# Patient Record
Sex: FEMALE | Race: WHITE | NOT HISPANIC OR LATINO | Employment: OTHER | URBAN - METROPOLITAN AREA
[De-identification: names, ages, dates, MRNs, and addresses within clinical notes are randomized per-mention and may not be internally consistent; named-entity substitution may affect disease eponyms.]

---

## 2017-06-19 ENCOUNTER — TRANSCRIBE ORDERS (OUTPATIENT)
Dept: ADMINISTRATIVE | Facility: HOSPITAL | Age: 72
End: 2017-06-19

## 2017-06-19 DIAGNOSIS — E04.2 NONTOXIC MULTINODULAR GOITER: Primary | ICD-10-CM

## 2017-06-19 DIAGNOSIS — E21.3 HYPERPARATHYROIDISM, UNSPECIFIED (HCC): ICD-10-CM

## 2017-08-02 ENCOUNTER — CONVERSION ENCOUNTER (OUTPATIENT)
Dept: RADIOLOGY | Facility: IMAGING CENTER | Age: 72
End: 2017-08-02

## 2017-09-26 ENCOUNTER — ALLSCRIPTS OFFICE VISIT (OUTPATIENT)
Dept: OTHER | Facility: OTHER | Age: 72
End: 2017-09-26

## 2017-10-27 NOTE — PROGRESS NOTES
Assessment  1  Chronic kidney disease (CKD), stage III (moderate) (585 3) (N18 3)   2  Hypercalcemia (275 42) (E83 52)   3  Benign hypertension with chronic kidney disease, stage III (403 10,585 3) (I12 9,N18 3)   4  Vitamin D insufficiency (268 9) (E55 9)    Discussion/Summary    Chronic kidney disease stage III B  with her baseline serum creatinine around 1 6Her serum creatinine 1 3-1 4 in 2014 which slowly progressively worsened to 1 6, last creatinine in the 1 5 in April 2016, no recent BMP results available since then  Repeat, CBC, BMP, phosphorus, PTH, UPC ratio before next visit  Her chronic kidney disease can be multifactorial given her long-term history of hypertension causing likely hypertensive nephrosclerosis, chronic lithium use can cause chronic interstitial nephritis, and significant use of NSAIDs in the past Patient not able to come off lithium as she has been doing very well on tapering dose of lithium Continue to monitor serum creatinine closely  No NSAIDs or nephrotoxins; No Fleet enemaRenal ultrasound in 2016 showed right kidney #8 6 cm, left kidney 9 6 cm, diffuse increased echogenicity in both kidneys, no hydronephrosis, no stones, bladder scan PVR nonsignificant  likely primary hyperparathyroidism (given the high PTH, high calcium, low normal vitamin D level, normal phosphorus level) versus chronic lithium use which can also cause hypercalcemia by increasing PTHStrongly advised to discontinue lithium if possible; Recent vitamin D 25 hydroxy level 26, calcium 10 1, serum  in May 2017  Her previous 24-hour urine calcium level was low 36 in February 2014 which can be secondary to renal failure and chronic lithium useAs per patient and brother, during last endocrine visit, she was recommended to have surgery versus starting prolia given she was found to have osteoporosis with hypercalcemia  She has chosen prolia with last dose given in 7/2017   Continue followup with endocrinologyHer parathyroid scan showed an parathyroid adenoma as per medical records  Her blood pressure is well controlled in the office todayContinue current antihypertensive regimen for now  BMDHer PTH is high along with high serum calcium likely suggestive of primary hyperparathyroidismContinue to monitor serum calcium phosphorus, PTH and serum magnesium level  D insufficiency, remains on vitamin D 100,000 units each month  Continue management as per PCP and endocrine  The patient was counseled regarding  Reason For Visit  Follow up for CKD      History of Present Illness  Patient is 75-year-old female with significant past medical history of hypertension for many years, hyperlipidemia, chronic kidney disease likely stage III, ? primary hyperparathyroidism and chronic lithium use but no history of diabetes or coronary artery disease comes for regular follow up for renal failure and hypercalcemia  She lives in the assisted living for last 5 years  Patient has not followed for more than a year with last visit in 1/2016  I do not have any recent BMP since April 2016  She was last seen by endocrine in June 2017  She has been on lithium for many years and she was on a very high dose of lithium in the past 2400 mg as per patient but her dose has been reduced in last couple years to 300 mg daily  also complains of having urinary incontinence and occasional urinary urgency  She has significant history of NSAIDs use for at least 4-5 years but she has not taken any NSAIDs since last visit  No hematuria or dysuria  baseline serum creatinine is around 1 5-1 6 in 2015-16 and 1 3-1 4 in 2014  Last serum creatinine improved to 1 5 in April 2016  She was diagnosed initially with hypercalcemia in 2004 with normal vitamin D, high PTH level  She has been following up with endocrine as well and was thought to have likely primary hyperparathyroidism versus lithium related hypercalcemia  She cannot come off lithium as per psychiatry  denies having any history of kidney stones or fractures in the past  She does not take hydrochlorothiazide  No any extra calcium supplements but she takes vitamin D 50,000 units 2 capsules once a month  She has family history positive for kidney issues  Her mother had a history of kidney stone, father history of kidney disease, sister has history of increase calcium level  last endocrine evaluation, patient was started on Prolia  Review of Systems    Constitutional: recent 15 lb weight gain, but-- no fever,-- no chills,-- no anorexia-- and-- no fatigue  Integumentary: no rashes  Gastrointestinal: no abdominal pain,-- no nausea,-- no diarrhea-- and-- no vomiting  Respiratory: no shortness of breath-- and-- no cough  Cardiovascular: no orthopnea,-- no chest pain-- and-- no palpitations  Neurological: no headache,-- no lightheadedness-- and-- no dizziness  Genitourinary: Occasionally incontinence-- and-- incomplete emptying of bladder, but-- no dysuria,-- no hematuria,-- no nocturia-- and-- no change in urinary frequency  Eyes: no eyesight problems  ENT: no nasal discharge  Psychiatric: anxiety,-- depression-- and-- Bipolar disease  Active Problems  1  Anxiety (300 00) (F41 9)   2  Back pain (724 5) (M54 9)   3  Bipolar disorder (296 80) (F31 9)   4  Chronic kidney disease (CKD), stage III (moderate) (585 3) (N18 3)   5  Chronic low back pain (724 2,338 29) (M54 5,G89 29)   6  Depression (311) (F32 9)   7  Hypercalcemia (275 42) (E83 52)   8  Hyperlipidemia (272 4) (E78 5)   9  Hypothyroidism (244 9) (E03 9)   10  Muscle spasm (728 85) (M62 838)   11  Need for prophylactic vaccination and inoculation against influenza (V04 81) (Z23)   12  Peripheral neuropathy (356 9) (G62 9)   13  Primary hyperparathyroidism (252 01) (E21 0)   14  Schizophrenia (295 90) (F20 9)   15  Seborrhea (706 3) (L21 9)    Past Medical History  1  History of Allergic rhinitis (477 9) (J30 9)   2   History of Fall at home (T546 9,C777 6) ([address]  XXXA,Y92 099)   3  History of Urinary incontinence (788 30) (R32)    The active problems and past medical history were reviewed and updated today  Surgical History  1  History of Tonsillectomy With Adenoidectomy    The surgical history was reviewed and updated today  Family History  Mother    1  Family history of hypertension (V17 49) (Z82 49)  Father    2  Family history of hypertension (V17 49) (Z82 49)    The family history was reviewed and updated today  Social History   · Never a smoker   · No alcohol use   · No drug use   · Travel history  The social history was reviewed and updated today  The social history was reviewed and is unchanged  Current Meds   1  AmLODIPine Besylate 10 MG Oral Tablet; Therapy: (Recorded:21Oct2015) to Recorded   2  Aricept 10 MG Oral Tablet; TAKE 1 TABLET BY MOUTH DAILY AT 8AM;   Therapy: (Recorded:01Bxh2580) to Recorded   3  Aspirin 81 MG TABS; TAKE 1 TABLET DAILY; Therapy: (Recorded:16Knf4905) to Recorded   4  Bengay Greaseless 10-15 % External Cream; USE AS DIRECTED; Therapy: [id number] to Recorded   5  Cardura 2 MG Oral Tablet; TAKE 1 TABLET DAILY; Therapy: [phone]) to Recorded   6  Claritin 10 MG Oral Tablet; TAKE 1 TABLET DAILY AS NEEDED; Therapy: (Recorded:31Mnk6802) to Recorded   7  CloNIDine HCl - 0 2 MG Oral Tablet; TAKE 1 TABLET TWICE DAILY; Therapy: [id number] to Recorded   8  Desitin Creamy 10 % CREA; Apply to diaper area as needed; Therapy: (Recorded:12Cdo7599) to Recorded   9  Jobst Anti-Em Knee High Med Miscellaneous; USE AS DIRECTED; Therapy: 12RGD6142 to Recorded   10  KlonoPIN 0 5 MG Oral Tablet; TAKE 1 TABLET TWICE DAILY; Therapy: (Recorded:08Skw5923) to Recorded   11  Lithium Carbonate  MG Oral Tablet Extended Release; TAKE 1 TABLET DAILY; Therapy: (Manny Evans) to Recorded   12  Milk of Magnesia SUSP;  Take 2 tablespoonful by mouth once daily as needed for constipation; Therapy: (Recorded:60Dmu5506) to Recorded   13  Mylanta 635-408-51 MG/5ML SUSP; Take 2 tablespoonsful by mouth twice daily as    needed for indigestion; Therapy: (Recorded:68Pyc3629) to Recorded   14  Pulmicort 0 5 MG/2ML Inhalation Suspension; USE 1 UNIT DOSE VIA NEBULIZER TWO    TIMES A DAY; Therapy: 20FAB7151 to Recorded   15  Tylenol 325 MG Oral Tablet; Take 2 tablets by mouth every 4 hours as needed for    pain/fever; Therapy: (Recorded:03Qiy7461) to Recorded   16  Vitamin B-12 1000 MCG Oral Tablet; Take 1 tablets by mouth daily at 8AM;    Therapy: (Recorded:72Fwp7495) to Recorded   17  Vitamin D3 71065 UNIT Oral Capsule; Take 2 capsule by mouth once monthly; Therapy: (Sophie Graham) to Recorded   18  Zocor 40 MG Oral Tablet; TAKE 1 TABLET DAILY; Therapy: (Recorded:59Ifl4548) to Recorded   19  Zoloft 50 MG Oral Tablet; TAKE 1 TABLET DAILY; Therapy: (Recorded:62Rrt5302) to Recorded   20  ZyPREXA 7 5 MG Oral Tablet; TAKE 1 TABLET DAILY; Therapy: (Recorded:73Mvu2369) to Recorded    The medication list was reviewed and updated today  Allergies  1  [address]   2  Penicillins    Vitals  Vital Signs    ** Printed in Appendix #1 below  Physical Exam    Constitutional: General appearance: No acute distress, well appearing and well nourished  Eyes: Anicteric sclerae  JVD:  No JVD present  Pulmonary: Respiratory effort: No increased work of breathing or signs of respiratory distress  -- Auscultation of lungs: Clear to auscultation  no rales or crackles were heard bilaterally  no rhonchi  no wheezing  no diminished breath sounds  Cardiovascular: Auscultation of heart: Normal rate and rhythm, normal S1 and S2, without murmurs  The heart rate was normal  Heart sounds: normal S1-- and-- normal S2  no murmurs were heard  Abdomen: Non-tender, no masses  -- soft, ND, BS +nt  Extremities: Extremities are abnormal--   trace-1+ edema in both legs     Pulses: Dorsalis Pedis and Posterior Tibial pulses normal    Rash: No rash present     Neurologic: Non Focal      Psychiatric: Orientation to person, place, and time: Normal  -- and-- Mood and affect: Normal        Signatures   Electronically signed by : SUSANNA White ; Sep 26 2017 10:50AM EST                       (Author)    Appendix #1     Vital Signs   Patient: Tyrone England ; : 1945; MRN: [id number]      Recorded: [id number] 09:08AM Recorded: 09LHD4691 08:45AM Recorded: 18VSH7608 43:41WU   Systolic 793, RUE, Sitting 140, LUE, Sitting    Diastolic 80, RUE, Sitting 72, LUE, Sitting    Height   5 ft 2 in   Weight   198 lb 8 0 oz   BMI Calculated   36 31   BSA Calculated   1 91

## 2017-12-11 ENCOUNTER — HOSPITAL ENCOUNTER (OUTPATIENT)
Dept: RADIOLOGY | Age: 72
Discharge: HOME | End: 2017-12-11
Payer: COMMERCIAL

## 2017-12-11 ENCOUNTER — GENERIC CONVERSION - ENCOUNTER (OUTPATIENT)
Dept: OTHER | Facility: OTHER | Age: 72
End: 2017-12-11

## 2017-12-11 DIAGNOSIS — E04.2 NONTOXIC MULTINODULAR GOITER: ICD-10-CM

## 2017-12-11 DIAGNOSIS — E21.3 HYPERPARATHYROIDISM (HCC): ICD-10-CM

## 2017-12-11 PROCEDURE — 76536 US EXAM OF HEAD AND NECK: CPT

## 2018-01-02 ENCOUNTER — TRANSCRIBE ORDERS (OUTPATIENT)
Dept: ADMINISTRATIVE | Facility: HOSPITAL | Age: 73
End: 2018-01-02

## 2018-01-02 DIAGNOSIS — M81.0 OSTEOPOROSIS, UNSPECIFIED OSTEOPOROSIS TYPE, UNSPECIFIED PATHOLOGICAL FRACTURE PRESENCE: Primary | ICD-10-CM

## 2018-01-12 NOTE — MISCELLANEOUS
Message  Dr Bob Lancaster office initially called to our office to notify that patient's blood pressure was fairly elevated in 160s  I tried to contact patient today and left voicemail as was unable to reach patient  I advised to call back to the office if her blood pressure is elevated or if she has any symptoms        Signatures   Electronically signed by : SUSANNA Posada ; Mar  9 2016  3:27PM EST                       (Author)

## 2018-01-14 VITALS
SYSTOLIC BLOOD PRESSURE: 130 MMHG | HEIGHT: 62 IN | DIASTOLIC BLOOD PRESSURE: 80 MMHG | WEIGHT: 198.5 LBS | BODY MASS INDEX: 36.53 KG/M2

## 2018-06-02 ENCOUNTER — TRANSCRIBE ORDERS (OUTPATIENT)
Dept: ADMINISTRATIVE | Age: 73
End: 2018-06-02

## 2018-06-04 ENCOUNTER — HOSPITAL ENCOUNTER (OUTPATIENT)
Dept: RADIOLOGY | Age: 73
Discharge: HOME | End: 2018-06-04
Payer: COMMERCIAL

## 2018-06-04 DIAGNOSIS — M81.0 OSTEOPOROSIS, UNSPECIFIED OSTEOPOROSIS TYPE, UNSPECIFIED PATHOLOGICAL FRACTURE PRESENCE: ICD-10-CM

## 2018-06-04 PROCEDURE — 77080 DXA BONE DENSITY AXIAL: CPT

## 2018-08-22 ENCOUNTER — OFFICE VISIT (OUTPATIENT)
Dept: NEPHROLOGY | Facility: CLINIC | Age: 73
End: 2018-08-22
Payer: COMMERCIAL

## 2018-08-22 VITALS
SYSTOLIC BLOOD PRESSURE: 144 MMHG | BODY MASS INDEX: 23.66 KG/M2 | DIASTOLIC BLOOD PRESSURE: 96 MMHG | WEIGHT: 142 LBS | HEIGHT: 65 IN | HEART RATE: 72 BPM

## 2018-08-22 DIAGNOSIS — E83.52 HYPERCALCEMIA: ICD-10-CM

## 2018-08-22 DIAGNOSIS — E21.0 PRIMARY HYPERPARATHYROIDISM (HCC): ICD-10-CM

## 2018-08-22 DIAGNOSIS — I12.9 BENIGN HYPERTENSION WITH CHRONIC KIDNEY DISEASE, STAGE III (HCC): ICD-10-CM

## 2018-08-22 DIAGNOSIS — N18.30 BENIGN HYPERTENSION WITH CHRONIC KIDNEY DISEASE, STAGE III (HCC): ICD-10-CM

## 2018-08-22 DIAGNOSIS — E55.9 VITAMIN D INSUFFICIENCY: ICD-10-CM

## 2018-08-22 DIAGNOSIS — E87.8 LOW BICARBONATE: ICD-10-CM

## 2018-08-22 DIAGNOSIS — E87.5 HYPERKALEMIA: ICD-10-CM

## 2018-08-22 DIAGNOSIS — N18.30 CHRONIC KIDNEY DISEASE (CKD), STAGE III (MODERATE) (HCC): Primary | ICD-10-CM

## 2018-08-22 PROCEDURE — 99214 OFFICE O/P EST MOD 30 MIN: CPT | Performed by: INTERNAL MEDICINE

## 2018-08-22 RX ORDER — CLONIDINE HYDROCHLORIDE 0.3 MG/1
0.3 TABLET ORAL 2 TIMES DAILY
COMMUNITY
End: 2018-08-22 | Stop reason: SDUPTHER

## 2018-08-22 RX ORDER — CLONAZEPAM 0.5 MG/1
1 TABLET ORAL 2 TIMES DAILY
COMMUNITY

## 2018-08-22 RX ORDER — SIMVASTATIN 40 MG
40 TABLET ORAL
COMMUNITY

## 2018-08-22 RX ORDER — AMLODIPINE BESYLATE 10 MG/1
10 TABLET ORAL DAILY
COMMUNITY

## 2018-08-22 RX ORDER — CLONIDINE HYDROCHLORIDE 0.2 MG/1
0.3 TABLET ORAL DAILY
COMMUNITY
End: 2018-08-22 | Stop reason: SDUPTHER

## 2018-08-22 RX ORDER — SODIUM BICARBONATE 650 MG/1
650 TABLET ORAL 2 TIMES DAILY
Qty: 60 TABLET | Refills: 4 | Status: SHIPPED | OUTPATIENT
Start: 2018-08-22

## 2018-08-22 RX ORDER — LANOLIN ALCOHOL/MO/W.PET/CERES
CREAM (GRAM) TOPICAL DAILY
COMMUNITY

## 2018-08-22 RX ORDER — CLONIDINE HYDROCHLORIDE 0.3 MG/1
0.3 TABLET ORAL 3 TIMES DAILY
Qty: 90 TABLET | Refills: 4 | Status: SHIPPED | OUTPATIENT
Start: 2018-08-22

## 2018-08-22 RX ORDER — LITHIUM CARBONATE 300 MG/1
300 CAPSULE ORAL DAILY
COMMUNITY

## 2018-08-22 RX ORDER — CHOLECALCIFEROL (VITAMIN D3) 1250 MCG
CAPSULE ORAL
COMMUNITY

## 2018-08-22 RX ORDER — BUDESONIDE 0.5 MG/2ML
1 INHALANT ORAL 2 TIMES DAILY
COMMUNITY
Start: 2015-10-21

## 2018-08-22 RX ORDER — DOXAZOSIN 2 MG/1
1 TABLET ORAL DAILY
COMMUNITY

## 2018-08-22 RX ORDER — CLONIDINE HYDROCHLORIDE 0.1 MG/1
TABLET ORAL
COMMUNITY
End: 2018-08-22 | Stop reason: SDUPTHER

## 2018-08-22 NOTE — PATIENT INSTRUCTIONS
Potassium Content of Foods List   WHAT YOU NEED TO KNOW:   What is potassium? Potassium is a mineral that is found in most foods  Potassium helps to balance fluids and minerals in your body  It also helps your body maintain a normal blood pressure  Potassium helps your muscles contract and your nerves function normally  You may need to increase or decrease potassium if you have certain health conditions  Why do I need to change the amount of potassium I eat? · You may need more potassium  if you have hypokalemia (low potassium levels) or high blood pressure  You may also need more potassium if you are taking diuretics  Diuretics and certain medicines cause your body to lose potassium  · You may need less potassium  in your diet if you have hyperkalemia (high potassium levels) or kidney disease  How much potassium does fruit contain? The amount of potassium in milligrams (mg) contained in each fruit or serving of fruit is listed beside the item  · High-potassium foods (more than 200 mg per serving):      ¨ 1 medium banana (425)    ¨ ½ of a papaya (390)    ¨ ½ cup of prune juice (370)    ¨ ¼ cup of raisins (270)    ¨ 1 medium caroline (325) or kiwi (240)    ¨ 1 small orange (240) or ½ cup of orange juice (235)    ¨ ½ cup of cubed cantaloupe (215) or diced honeydew melon (200)    ¨ 1 medium pear (200)    · Medium-potassium foods (50 to 200 mg per serving):      ¨ 1 medium peach (185)    ¨ 1 small apple or ½ cup of apple juice (150)    ¨ ½ cup of peaches canned in juice (120)    ¨ ½ cup of canned pineapple (100)    ¨ ½ cup of fresh, sliced strawberries (671)    ¨ ½ cup of watermelon (85)    · Low-potassium foods (less than 50 mg per serving):      ¨ ½ cup of cranberries (45) or cranberry juice cocktail (20)    ¨ ½ cup of nectar of papaya, caroline, or pear (35)  How much potassium do vegetables contain?    · High-potassium foods (more than 200 mg per serving):      ¨ 1 medium baked potato, with skin (925)    ¨ 1 baked medium sweet potato, with skin (450)    ¨ ½ cup of tomato or vegetable juice (275), or 1 medium raw tomato (290)    ¨ ½ cup of mushrooms (280)    ¨ ½ cup of fresh brussels sprouts (250)    ¨ ½ cup of cooked zucchini (220) or winter squash (250)    ¨ ¼ of a medium avocado (245)    ¨ ½ cup of broccoli (230)    · Medium-potassium foods (50 to 200 mg per serving):      ¨ ½ cup of corn (195)    ¨ ½ cup of fresh or cooked carrots (180)    ¨ ½ cup of fresh cauliflower (150)    ¨ ½ cup of asparagus (155)    ¨ ½ cup of canned peas (90)     ¨ 1 cup of lettuce, all types (100)    ¨ ½ cup of fresh green beans (90)    ¨ ½ cup of frozen green beans (85)    ¨ ½ cup of cucumber (80)  How much potassium do protein foods contain? · High-potassium foods (more than 200 mg per serving):      ¨ ½ cup of cooked cat beans (400) or lentils (365)    ¨ 1 cup of soy milk (300)    ¨ 3 ounces of baked or broiled salmon (319)    ¨ 3 ounces of roasted turkey, dark meat (250)    ¨ ¼ cup of sunflower seeds (241)    ¨ 3 ounces of cooked lean beef (224)    ¨ 2 tablespoons of smooth peanut butter (210)    · Medium-potassium foods (50 to 200 mg per serving):      ¨ 1 ounce of salted peanuts, almonds, or cashews (200)    ¨ 1 large egg (60)  How much potassium do dairy foods contain? · High-potassium foods (more than 200 mg per serving):      ¨ 6 ounces of yogurt (260 to 435)    ¨ 1 cup of nonfat, low-fat, or whole milk (350 to 380)    · Medium-potassium foods (50 to 200 mg per serving):      ¨ ½ cup of ricotta cheese (154)    ¨ ½ cup of vanilla ice cream (131)    ¨ ½ cup of low-fat (2%) cottage cheese (110)    · Low-potassium foods (less than 50 mg per serving):      ¨ 1 ounce of cheese (20 to 30)    How much potassium do grains contain?    · 1 slice of white bread (30)    · ½ cup of white or brown rice (50)    · ½ cup of spaghetti or macaroni (30)    · 1 flour or corn tortilla (50)    · 1 four-inch waffle (50)  What other foods contain potassium? · 1 tablespoon of molasses (295)    · 1½ ounces of chocolate (165)    · Some salt substitutes may contain a high amount of potassium  Check the food label to find the amount of potassium it contains  CARE AGREEMENT:   You have the right to help plan your care  Discuss treatment options with your caregivers to decide what care you want to receive  You always have the right to refuse treatment  The above information is an  only  It is not intended as medical advice for individual conditions or treatments  Talk to your doctor, nurse or pharmacist before following any medical regimen to see if it is safe and effective for you  © 2017 [facility] Information is for End User's use only and may not be sold, redistributed or otherwise used for commercial purposes  All illustrations and images included in CareNotes® are the copyrighted property of A D [employer] M [employer]  or Miguel Pierce

## 2018-08-22 NOTE — PROGRESS NOTES
NEPHROLOGY OUTPATIENT PROGRESS NOTE   Alex Baig 74 y o  female MRN: [id number]  DATE: 8/22/2018  Reason for visit:   Chief Complaint   Patient presents with    Follow-up    Chronic Kidney Disease     ASSESSMENT and PLAN:  Chronic kidney disease stage III B  with her baseline serum creatinine around 1 4-1 7 (previous baseline 1 3 to 1 4 in 2014)  -last creatinine 1 6 in August 2018 overall stable at baseline    -Repeat, CBC, BMP, phosphorus, PTH, UPC ratio before next visit   -Her chronic kidney disease can be multifactorial given her long-term history of hypertension causing likely hypertensive nephrosclerosis, chronic lithium use can cause chronic interstitial nephritis, and significant use of NSAIDs in the past   -Patient not able to come off lithium as she has been doing very well on tapering dose of lithium   -Continue to monitor serum creatinine closely    -No NSAIDs or nephrotoxins; No Fleet enema  -Renal ultrasound in 2016 showed right kidney 8 6 cm, left kidney 9 6 cm, diffuse increased echogenicity in both kidneys, no hydronephrosis, no stones, bladder scan PVR nonsignificant      likely primary hyperparathyroidism (given the high PTH, high calcium, low normal vitamin D level, normal phosphorus level) versus chronic lithium use which can also cause hypercalcemia by increasing PTH  -Strongly advised to discontinue lithium if possible; patient and family will discuss this further with psychiatrist during next visit  -Recent vitamin D 25 hydroxy level 35 in March 2018, calcium level has been fluctuating with last calcium improved to eight in August 2018 at goal, serum  in June 2018    -Her previous 24-hour urine calcium level was low 36 in February 2014 which can be secondary to renal failure and chronic lithium use  -As per patient and brother, there was discussion regarding surgery versus prolia given she was found to have osteoporosis with hypercalcemia in the past by endocrine   She has chosen prolia and currently remains on same    -Continue followup with endocrinology  -Her parathyroid scan showed an parathyroid adenoma as per medical records     Hypertension  -Her blood pressure is uncontrolled in the office today   -will increase clonidine from 0 3 mg p o  b i d  to t i d  dosing  Low bicarb  -could be secondary to presume metabolic acidosis in the setting of CKD  Will start patient on sodium bicarbonate one tablet p  o  B i d  -if this remains persistent, consider checking VBG  Hyperkalemia  -likely secondary to significant dietary potassium intake in the setting of CKD  She admits to be eating three bananas daily, tomato soup, potatoes etc   -strongly advised to follow low potassium diet  Education material provided  -repeat BMP in one month   -last serum potassium 5 2 in August 2018   -she does have intermittent hyperkalemia issues in the past   May consider serum aldosterone, plasma renin activity during next visit    CKD BMD  -Her PTH is high along with intermittent high serum calcium likely suggestive of primary hyperparathyroidism versus lithium related  -Continue to monitor serum calcium phosphorus, PTH and serum magnesium level  Vitamin D insufficiency, remains on vitamin D 95914 units each month  Last vitamin-D level 35 in March 2018  Continue management as per PCP and endocrine  Diagnoses and all orders for this visit:    Chronic kidney disease (CKD), stage III (moderate)  -     Basic metabolic panel; Future  -     Protein / creatinine ratio, urine; Future  -     Basic metabolic panel; Future  -     CBC; Future  -     Phosphorus; Future  -     PTH, intact; Future  -     Protein / creatinine ratio, urine; Future  -     Magnesium; Future    Benign hypertension with chronic kidney disease, stage III  -     cloNIDine (CATAPRES) 0 3 mg tablet;  Take 1 tablet (0 3 mg total) by mouth 3 (three) times a day    Hypercalcemia    Primary hyperparathyroidism ([facility] [address] )  - Phosphorus; Future  -     PTH, intact; Future    Vitamin D insufficiency    Low bicarbonate  -     sodium bicarbonate 650 mg tablet; Take 1 tablet (650 mg total) by mouth 2 (two) times a day    Hyperkalemia    Other orders  -     amLODIPine (NORVASC) 10 mg tablet; Take 10 mg by mouth daily    -     doxazosin (CARDURA) 2 mg tablet; Take 1 tablet by mouth daily  -     lithium carbonate 300 mg capsule; Take 300 mg by mouth Daily    -     sertraline (ZOLOFT) 50 mg tablet; Take 50 mg by mouth daily    -     simvastatin (ZOCOR) 40 mg tablet; Take 40 mg by mouth daily at bedtime    -     cyanocobalamin (VITAMIN B-12) 1,000 mcg tablet; Take by mouth daily    -     Cholecalciferol (VITAMIN D3) 90732 units CAPS; Take by mouth every 30 (thirty) days   -     budesonide (PULMICORT) 0 5 mg/2 mL nebulizer solution; Inhale 1 each 2 (two) times a day  -     clonazePAM (KLONOPIN) 0 5 mg tablet; Take 1 tablet by mouth 2 (two) times a day  -     Discontinue: cloNIDine (CATAPRES) 0 1 mg tablet; clonidine HCl 0 3 mg tablet  -     Discontinue: cloNIDine (CATAPRES) 0 2 mg tablet; Take 0 3 mg by mouth daily    -     Acetaminophen 325 MG CAPS; Take 650 mg by mouth 3 (three) times a day    -     Discontinue: cloNIDine (CATAPRES) 0 3 mg tablet; Take 0 3 mg by mouth 2 (two) times a day          SUBJECTIVE / HPI:  Patient is 64-year-old female with significant past medical history of hypertension for many years, hyperlipidemia, chronic kidney disease likely stage III, ? primary hyperparathyroidism and chronic lithium use but no history of diabetes or coronary artery disease comes for regular follow up for renal failure and hypercalcemia  She lives in the assisted living for last 5 years  Patient has not followed for almost a year with last visit in September 2017  She was last seen by endocrine in June 2018   She has been on lithium for many years and she was on a very high dose of lithium in the past 2400 mg as per patient but her dose has been reduced in last couple years to 300 mg daily  also complains of having urinary incontinence and occasional urinary urgency  She has significant history of NSAIDs use for at least 4-5 years but she has not taken any NSAIDs since last visit  No hematuria or dysuria  baseline serum creatinine is around 1 4 to 1 7  She has overall fluctuating serum creatinine  Last serum creatinine overall stable at baseline  She recently visited ER due to elevated potassium level and since then serum potassium is slightly improved to 5 2  She admits to be eating high potassium diet including bananas, tomatoes, potatoes etc      She was diagnosed initially with hypercalcemia in 2004 with normal vitamin D, high PTH level  She has been following up with endocrine as well and was thought to have likely primary hyperparathyroidism versus lithium related hypercalcemia  She cannot come off lithium as per psychiatry  denies having any history of kidney stones or fractures in the past  She does not take hydrochlorothiazide  No any extra calcium supplements but she takes vitamin D 50,000 units once a month  She has family history positive for kidney issues  Her mother had a history of kidney stone, father history of kidney disease, sister has history of increase calcium level  She remains on Prolia by endocrine  Will obtain last endocrine visit note  REVIEW OF SYSTEMS:  More than 10 point review of systems were obtained and discussed in length with the patient  Complete review of systems were negative / unremarkable except mentioned above  PHYSICAL EXAM:  Vitals:    08/22/18 0827   BP: 144/96   BP Location: Left arm   Patient Position: Sitting   Cuff Size: Standard   Pulse: 72   Weight: 64 4 kg (142 lb)   Height: 5' 5" (1 651 m)     Body mass index is 23 63 kg/m²  Physical Exam   Constitutional: She is oriented to person, place, and time  She appears well-developed and well-nourished     HENT:   Head: Normocephalic and atraumatic  Right Ear: External ear normal    Left Ear: External ear normal    Eyes: Conjunctivae and EOM are normal  Pupils are equal, round, and reactive to light  Neck: Neck supple  No JVD present  Cardiovascular: Normal rate and normal heart sounds  Pulmonary/Chest: Effort normal and breath sounds normal  She has no wheezes  She has no rales  Abdominal: Soft  Bowel sounds are normal  She exhibits no distension  There is no tenderness  Musculoskeletal: She exhibits no edema or tenderness  Neurological: She is alert and oriented to person, place, and time  Skin: Skin is warm and dry  No rash noted  Psychiatric: She has a normal mood and affect  Her behavior is normal    Vitals reviewed        PAST MEDICAL HISTORY:  Past Medical History:   Diagnosis Date    Allergic rhinitis     Anxiety     Back pain     Benign hypertension with chronic kidney disease, stage III     Bipolar disorder (HCC)     Chronic low back pain     CKD (chronic kidney disease) stage 3, GFR 30-59 ml/min     Depression     Hypercalcemia     Hyperlipidemia     Hypothyroidism     Muscle spasm     Peripheral neuropathy     Primary hyperparathyroidism ([address] )     Schizophrenia ([address] )     Seborrhea     Urinary incontinence     Vitamin D insufficiency        PAST SURGICAL HISTORY:  Past Surgical History:   Procedure Laterality Date    TONSILLECTOMY AND ADENOIDECTOMY         SOCIAL HISTORY:  History   Alcohol Use No     History   Drug Use No     History   Smoking Status    Never Smoker   Smokeless Tobacco    Never Used       FAMILY HISTORY:  Family History   Problem Relation Age of Onset    Hypertension Mother     Hypertension Father        MEDICATIONS:    Current Outpatient Prescriptions:     Acetaminophen 325 MG CAPS, Take 650 mg by mouth 3 (three) times a day  , Disp: , Rfl:     amLODIPine (NORVASC) 10 mg tablet, Take 10 mg by mouth daily  , Disp: , Rfl:     budesonide (PULMICORT) 0 5 mg/2 mL nebulizer solution, Inhale 1 each 2 (two) times a day, Disp: , Rfl:     Cholecalciferol (VITAMIN D3) 43087 units CAPS, Take by mouth every 30 (thirty) days , Disp: , Rfl:     clonazePAM (KLONOPIN) 0 5 mg tablet, Take 1 tablet by mouth 2 (two) times a day, Disp: , Rfl:     cloNIDine (CATAPRES) 0 3 mg tablet, Take 1 tablet (0 3 mg total) by mouth 3 (three) times a day, Disp: 90 tablet, Rfl: 4    cyanocobalamin (VITAMIN B-12) 1,000 mcg tablet, Take by mouth daily  , Disp: , Rfl:     doxazosin (CARDURA) 2 mg tablet, Take 1 tablet by mouth daily, Disp: , Rfl:     lithium carbonate 300 mg capsule, Take 300 mg by mouth Daily  , Disp: , Rfl:     sertraline (ZOLOFT) 50 mg tablet, Take 50 mg by mouth daily  , Disp: , Rfl:     simvastatin (ZOCOR) 40 mg tablet, Take 40 mg by mouth daily at bedtime  , Disp: , Rfl:     sodium bicarbonate 650 mg tablet, Take 1 tablet (650 mg total) by mouth 2 (two) times a day, Disp: 60 tablet, Rfl: 4    Lab Results:   Results for orders placed or performed in visit on 04/08/16   CBC AND PLATELET (HISTORICAL)   Result Value Ref Range    WBC 7 0 3 8 - 10 8 Thousand/uL    RBC 3 95 3 80 - 5 10 Million/uL    Hemoglobin 11 1 (L) 11 7 - 15 5 g/dL    Hematocrit 35 8 35 0 - 45 0 %    MCV 90 6 80 0 - 100 0 fL    MCH 28 1 27 0 - 33 0 pg    MCHC 31 0 (L) 32 0 - 36 0 g/dL    RDW 15 1 (H) 11 0 - 15 0 %    Platelets 310 533 - 278 Thousand/uL    MPV 9 4 7 5 - 11 5 fL    Serum creatinine 1 6 in August 2018

## 2018-08-22 NOTE — LETTER
August 22, 2018     [facility] [place], [id number] [address]    Patient: Georgiana Panda   YOB: 1945   Date of Visit: 8/22/2018       Dear Dr Guo Legacy:    Thank you for referring Maxx Gagnon to me for evaluation  Below are my notes for this consultation  If you have questions, please do not hesitate to call me  I look forward to following your patient along with you  Sincerely,        Ela Romero MD        CC: No Recipients  Ela Romero MD  8/22/2018  1:46 PM  Sign at close encounter  Romelia 10 74 y o  female MRN: [id number]  DATE: 8/22/2018  Reason for visit:   Chief Complaint   Patient presents with    Follow-up    Chronic Kidney Disease     ASSESSMENT and PLAN:  Chronic kidney disease stage III B  with her baseline serum creatinine around 1 4-1 7 (previous baseline 1 3 to 1 4 in 2014)  -last creatinine 1 6 in August 2018 overall stable at baseline    -Repeat, CBC, BMP, phosphorus, PTH, UPC ratio before next visit   -Her chronic kidney disease can be multifactorial given her long-term history of hypertension causing likely hypertensive nephrosclerosis, chronic lithium use can cause chronic interstitial nephritis, and significant use of NSAIDs in the past   -Patient not able to come off lithium as she has been doing very well on tapering dose of lithium   -Continue to monitor serum creatinine closely    -No NSAIDs or nephrotoxins;  No Fleet enema  -Renal ultrasound in 2016 showed right kidney 8 6 cm, left kidney 9 6 cm, diffuse increased echogenicity in both kidneys, no hydronephrosis, no stones, bladder scan PVR nonsignificant      likely primary hyperparathyroidism (given the high PTH, high calcium, low normal vitamin D level, normal phosphorus level) versus chronic lithium use which can also cause hypercalcemia by increasing PTH  -Strongly advised to discontinue lithium if possible; patient and family will discuss this further with psychiatrist during next visit  -Recent vitamin D 25 hydroxy level 35 in March 2018, calcium level has been fluctuating with last calcium improved to eight in August 2018 at goal, serum  in June 2018    -Her previous 24-hour urine calcium level was low 36 in February 2014 which can be secondary to renal failure and chronic lithium use  -As per patient and brother, there was discussion regarding surgery versus prolia given she was found to have osteoporosis with hypercalcemia in the past by endocrine  She has chosen prolia and currently remains on same    -Continue followup with endocrinology  -Her parathyroid scan showed an parathyroid adenoma as per medical records     Hypertension  -Her blood pressure is uncontrolled in the office today   -will increase clonidine from 0 3 mg p o  b i d  to t i d  dosing  Low bicarb  -could be secondary to presume metabolic acidosis in the setting of CKD  Will start patient on sodium bicarbonate one tablet p  o  B i d  -if this remains persistent, consider checking VBG  Hyperkalemia  -likely secondary to significant dietary potassium intake in the setting of CKD  She admits to be eating three bananas daily, tomato soup, potatoes etc   -strongly advised to follow low potassium diet  Education material provided  -repeat BMP in one month   -last serum potassium 5 2 in August 2018   -she does have intermittent hyperkalemia issues in the past   May consider serum aldosterone, plasma renin activity during next visit    CKD BMD  -Her PTH is high along with intermittent high serum calcium likely suggestive of primary hyperparathyroidism versus lithium related  -Continue to monitor serum calcium phosphorus, PTH and serum magnesium level  Vitamin D insufficiency, remains on vitamin D 74546 units each month  Last vitamin-D level 35 in March 2018  Continue management as per PCP and endocrine       Diagnoses and all orders for this visit:    Chronic kidney disease (CKD), stage III (moderate)  -     Basic metabolic panel; Future  -     Protein / creatinine ratio, urine; Future  -     Basic metabolic panel; Future  -     CBC; Future  -     Phosphorus; Future  -     PTH, intact; Future  -     Protein / creatinine ratio, urine; Future  -     Magnesium; Future    Benign hypertension with chronic kidney disease, stage III  -     cloNIDine (CATAPRES) 0 3 mg tablet; Take 1 tablet (0 3 mg total) by mouth 3 (three) times a day    Hypercalcemia    Primary hyperparathyroidism ([address] )  -     Phosphorus; Future  -     PTH, intact; Future    Vitamin D insufficiency    Low bicarbonate  -     sodium bicarbonate 650 mg tablet; Take 1 tablet (650 mg total) by mouth 2 (two) times a day    Hyperkalemia    Other orders  -     amLODIPine (NORVASC) 10 mg tablet; Take 10 mg by mouth daily    -     doxazosin (CARDURA) 2 mg tablet; Take 1 tablet by mouth daily  -     lithium carbonate 300 mg capsule; Take 300 mg by mouth Daily    -     sertraline (ZOLOFT) 50 mg tablet; Take 50 mg by mouth daily    -     simvastatin (ZOCOR) 40 mg tablet; Take 40 mg by mouth daily at bedtime    -     cyanocobalamin (VITAMIN B-12) 1,000 mcg tablet; Take by mouth daily    -     Cholecalciferol (VITAMIN D3) 13119 units CAPS; Take by mouth every 30 (thirty) days   -     budesonide (PULMICORT) 0 5 mg/2 mL nebulizer solution; Inhale 1 each 2 (two) times a day  -     clonazePAM (KLONOPIN) 0 5 mg tablet; Take 1 tablet by mouth 2 (two) times a day  -     Discontinue: cloNIDine (CATAPRES) 0 1 mg tablet; clonidine HCl 0 3 mg tablet  -     Discontinue: cloNIDine (CATAPRES) 0 2 mg tablet; Take 0 3 mg by mouth daily    -     Acetaminophen 325 MG CAPS; Take 650 mg by mouth 3 (three) times a day    -     Discontinue: cloNIDine (CATAPRES) 0 3 mg tablet;  Take 0 3 mg by mouth 2 (two) times a day          SUBJECTIVE / HPI:  Patient is 40-year-old female with significant past medical history of hypertension for many years, hyperlipidemia, chronic kidney disease likely stage III, ? primary hyperparathyroidism and chronic lithium use but no history of diabetes or coronary artery disease comes for regular follow up for renal failure and hypercalcemia  She lives in the assisted living for last 5 years  Patient has not followed for almost a year with last visit in September 2017  She was last seen by endocrine in June 2018  She has been on lithium for many years and she was on a very high dose of lithium in the past 2400 mg as per patient but her dose has been reduced in last couple years to 300 mg daily  also complains of having urinary incontinence and occasional urinary urgency  She has significant history of NSAIDs use for at least 4-5 years but she has not taken any NSAIDs since last visit  No hematuria or dysuria  baseline serum creatinine is around 1 4 to 1 7  She has overall fluctuating serum creatinine  Last serum creatinine overall stable at baseline  She recently visited ER due to elevated potassium level and since then serum potassium is slightly improved to 5 2  She admits to be eating high potassium diet including bananas, tomatoes, potatoes etc      She was diagnosed initially with hypercalcemia in 2004 with normal vitamin D, high PTH level  She has been following up with endocrine as well and was thought to have likely primary hyperparathyroidism versus lithium related hypercalcemia  She cannot come off lithium as per psychiatry  denies having any history of kidney stones or fractures in the past  She does not take hydrochlorothiazide  No any extra calcium supplements but she takes vitamin D 50,000 units once a month  She has family history positive for kidney issues  Her mother had a history of kidney stone, father history of kidney disease, sister has history of increase calcium level  She remains on Prolia by endocrine  Will obtain last endocrine visit note      REVIEW OF SYSTEMS:  More than 10 point review of systems were obtained and discussed in length with the patient  Complete review of systems were negative / unremarkable except mentioned above  PHYSICAL EXAM:  Vitals:    08/22/18 0827   BP: 144/96   BP Location: Left arm   Patient Position: Sitting   Cuff Size: Standard   Pulse: 72   Weight: 64 4 kg (142 lb)   Height: 5' 5" (1 651 m)     Body mass index is 23 63 kg/m²  Physical Exam   Constitutional: She is oriented to person, place, and time  She appears well-developed and well-nourished  HENT:   Head: Normocephalic and atraumatic  Right Ear: External ear normal    Left Ear: External ear normal    Eyes: Conjunctivae and EOM are normal  Pupils are equal, round, and reactive to light  Neck: Neck supple  No JVD present  Cardiovascular: Normal rate and normal heart sounds  Pulmonary/Chest: Effort normal and breath sounds normal  She has no wheezes  She has no rales  Abdominal: Soft  Bowel sounds are normal  She exhibits no distension  There is no tenderness  Musculoskeletal: She exhibits no edema or tenderness  Neurological: She is alert and oriented to person, place, and time  Skin: Skin is warm and dry  No rash noted  Psychiatric: She has a normal mood and affect  Her behavior is normal    Vitals reviewed        PAST MEDICAL HISTORY:  Past Medical History:   Diagnosis Date    Allergic rhinitis     Anxiety     Back pain     Benign hypertension with chronic kidney disease, stage III     Bipolar disorder (HCC)     Chronic low back pain     CKD (chronic kidney disease) stage 3, GFR 30-59 ml/min     Depression     Hypercalcemia     Hyperlipidemia     Hypothyroidism     Muscle spasm     Peripheral neuropathy     Primary hyperparathyroidism ([address] )     Schizophrenia ([address]     Seborrhea     Urinary incontinence     Vitamin D insufficiency        PAST SURGICAL HISTORY:  Past Surgical History:   Procedure Laterality Date    TONSILLECTOMY AND ADENOIDECTOMY         SOCIAL HISTORY:  History   Alcohol Use No     History   Drug Use No     History   Smoking Status    Never Smoker   Smokeless Tobacco    Never Used       FAMILY HISTORY:  Family History   Problem Relation Age of Onset    Hypertension Mother     Hypertension Father        MEDICATIONS:    Current Outpatient Prescriptions:     Acetaminophen 325 MG CAPS, Take 650 mg by mouth 3 (three) times a day  , Disp: , Rfl:     amLODIPine (NORVASC) 10 mg tablet, Take 10 mg by mouth daily  , Disp: , Rfl:     budesonide (PULMICORT) 0 5 mg/2 mL nebulizer solution, Inhale 1 each 2 (two) times a day, Disp: , Rfl:     Cholecalciferol (VITAMIN D3) 39223 units CAPS, Take by mouth every 30 (thirty) days , Disp: , Rfl:     clonazePAM (KLONOPIN) 0 5 mg tablet, Take 1 tablet by mouth 2 (two) times a day, Disp: , Rfl:     cloNIDine (CATAPRES) 0 3 mg tablet, Take 1 tablet (0 3 mg total) by mouth 3 (three) times a day, Disp: 90 tablet, Rfl: 4    cyanocobalamin (VITAMIN B-12) 1,000 mcg tablet, Take by mouth daily  , Disp: , Rfl:     doxazosin (CARDURA) 2 mg tablet, Take 1 tablet by mouth daily, Disp: , Rfl:     lithium carbonate 300 mg capsule, Take 300 mg by mouth Daily  , Disp: , Rfl:     sertraline (ZOLOFT) 50 mg tablet, Take 50 mg by mouth daily  , Disp: , Rfl:     simvastatin (ZOCOR) 40 mg tablet, Take 40 mg by mouth daily at bedtime  , Disp: , Rfl:     sodium bicarbonate 650 mg tablet, Take 1 tablet (650 mg total) by mouth 2 (two) times a day, Disp: 60 tablet, Rfl: 4    Lab Results:   Results for orders placed or performed in visit on 04/08/16   CBC AND PLATELET (HISTORICAL)   Result Value Ref Range    WBC 7 0 3 8 - 10 8 Thousand/uL    RBC 3 95 3 80 - 5 10 Million/uL    Hemoglobin 11 1 (L) 11 7 - 15 5 g/dL    Hematocrit 35 8 35 0 - 45 0 %    MCV 90 6 80 0 - 100 0 fL    MCH 28 1 27 0 - 33 0 pg    MCHC 31 0 (L) 32 0 - 36 0 g/dL    RDW 15 1 (H) 11 0 - 15 0 %    Platelets 549 766 - 524 Thousand/uL    MPV 9 4 7 5 - 11 5 fL    Serum creatinine 1 6 in August 2018

## 2018-09-11 ENCOUNTER — TRANSCRIBE ORDERS (OUTPATIENT)
Dept: ADMINISTRATIVE | Facility: HOSPITAL | Age: 73
End: 2018-09-11

## 2018-09-11 DIAGNOSIS — Z12.39 BREAST SCREENING: Primary | ICD-10-CM

## 2018-09-13 LAB — EXTERNAL PTH: 988.2

## 2018-09-16 ENCOUNTER — TELEPHONE (OUTPATIENT)
Dept: OTHER | Facility: HOSPITAL | Age: 73
End: 2018-09-16

## 2018-09-16 NOTE — TELEPHONE ENCOUNTER
Can you please let her know that Cr stable  Sodium level elevated at 149  She needs to drink plenty of free water daily which should help reduce sodium level  Also bicarb level remains lower  She was started on sodium bicarb supplement last visit and will increase dose to 2 tab BID  Repeat BMP in 1 week after making above changes

## 2018-09-17 DIAGNOSIS — E87.0 HYPERNATREMIA: Primary | ICD-10-CM

## 2018-09-17 NOTE — TELEPHONE ENCOUNTER
Spoke with the patient's brother and he is aware of the lab work results and for her to drink more water and to increase sodium bicarbonate to 2 tablets twice daily and to repeat a BMP in 1 week and the lab slip has been mailed out

## 2018-09-21 ENCOUNTER — HOSPITAL ENCOUNTER (OUTPATIENT)
Dept: RADIOLOGY | Facility: IMAGING CENTER | Age: 73
Discharge: HOME | End: 2018-09-21
Payer: COMMERCIAL

## 2018-09-21 DIAGNOSIS — Z12.39 BREAST SCREENING: ICD-10-CM

## 2018-09-21 PROCEDURE — 77067 SCR MAMMO BI INCL CAD: CPT

## 2018-09-28 ENCOUNTER — TELEPHONE (OUTPATIENT)
Dept: OTHER | Facility: HOSPITAL | Age: 73
End: 2018-09-28

## 2018-09-28 NOTE — TELEPHONE ENCOUNTER
I have talked to Dr Marycarmen Seaman from endocrine regarding last lab results from 9/12/18  Her PTH worsened to 988  As per my discussion with him, patient has refused to go for surgery for parathyroid and she is unable to come off lithium from psychiatry  She may be a candidate for sensipar  He will start this medication after reviewing most recent labs  We willfax results to his office

## 2018-12-20 ENCOUNTER — OFFICE VISIT (OUTPATIENT)
Dept: NEPHROLOGY | Facility: CLINIC | Age: 73
End: 2018-12-20
Payer: COMMERCIAL

## 2018-12-20 VITALS — SYSTOLIC BLOOD PRESSURE: 136 MMHG | HEART RATE: 64 BPM | RESPIRATION RATE: 16 BRPM | DIASTOLIC BLOOD PRESSURE: 70 MMHG

## 2018-12-20 DIAGNOSIS — N18.30 BENIGN HYPERTENSION WITH CHRONIC KIDNEY DISEASE, STAGE III (HCC): ICD-10-CM

## 2018-12-20 DIAGNOSIS — E21.0 PRIMARY HYPERPARATHYROIDISM (HCC): ICD-10-CM

## 2018-12-20 DIAGNOSIS — N18.30 CHRONIC KIDNEY DISEASE (CKD), STAGE III (MODERATE) (HCC): Primary | ICD-10-CM

## 2018-12-20 DIAGNOSIS — E55.9 VITAMIN D INSUFFICIENCY: ICD-10-CM

## 2018-12-20 DIAGNOSIS — I12.9 BENIGN HYPERTENSION WITH CHRONIC KIDNEY DISEASE, STAGE III (HCC): ICD-10-CM

## 2018-12-20 DIAGNOSIS — E83.52 HYPERCALCEMIA: ICD-10-CM

## 2018-12-20 DIAGNOSIS — E87.5 HYPERKALEMIA: ICD-10-CM

## 2018-12-20 PROCEDURE — 99214 OFFICE O/P EST MOD 30 MIN: CPT | Performed by: INTERNAL MEDICINE

## 2018-12-20 RX ORDER — CINACALCET 30 MG/1
30 TABLET, FILM COATED ORAL
Qty: 30 TABLET | Refills: 5 | Status: SHIPPED | OUTPATIENT
Start: 2018-12-20 | End: 2019-01-15 | Stop reason: SDUPTHER

## 2018-12-20 NOTE — LETTER
December 20, 2018     [id number]    Patient: Tucker Núñez   YOB: 1945   Date of Visit: 12/20/2018       Dear Dr Delayne Leyden: Thank you for referring Elvin Lind to me for evaluation  Below are my notes for this consultation  If you have questions, please do not hesitate to call me  I look forward to following your patient along with you  Sincerely,        Reginaldo Miranda MD        CC: No Recipients  Reginaldo Miranda MD  12/20/2018 12:25 PM  Signed  NEPHROLOGY OUTPATIENT PROGRESS NOTE   Tucker Núñez 74 y o  female MRN: [id number]  DATE: 12/20/2018  Reason for visit:   Chief Complaint   Patient presents with    Follow-up    Chronic Kidney Disease     ASSESSMENT and PLAN:  Chronic kidney disease stage III B  with her baseline serum creatinine around 1 5-1 7 (previous baseline 1 3 to 1 4 in 2014)  -last creatinine 1 7 in October 2018 overall stable at baseline    -Repeat, CBC, BMP, phosphorus, PTH, UPC ratio before next visit   -Her chronic kidney disease can be multifactorial given her long-term history of hypertension causing likely hypertensive nephrosclerosis, chronic lithium use can cause chronic interstitial nephritis, and significant use of NSAIDs in the past   -Patient not able to come off lithium as she has been doing very well on tapering dose of lithium   -Continue to monitor serum creatinine closely    -No NSAIDs or nephrotoxins; No Fleet enema  -renal ultrasound in November 2018 showed right kidney 9 9 cm, left kidney 12 2 cm, no hydronephrosis although exophytic solid mass in the lower pole of left kidney    Prior renal ultrasound in 2016 showed diffuse increased echogenicity in both kidneys which was not reported on most recent ultrasound      likely primary hyperparathyroidism (given the high PTH, high calcium, low normal vitamin D level, normal phosphorus level) versus chronic lithium use which can also cause hypercalcemia by increasing PTH  -patient is unable to come off lithium as per patient discussion with Psychiatry   -vitamin D 25 hydroxy level 35 in March 2018,  in September 2018, last serum calcium 10 9 in October 2018    -Her previous 24-hour urine calcium level was low 36 in February 2014 which can be secondary to renal failure and chronic lithium use  -as per my prior discussion with patient's endocrinologist Dr Sunny De Los Santos in September 2018, plan is to start Sensipar as patient does not want to proceed with surgery  Patient has not been started on Sensipar since then  Will start Sensipar 30 mg p o  Daily   -remains on Prolia injections Q six monthly  Next scheduled in January 2019 as per patient   -Continue followup with endocrinology  -Her parathyroid scan showed an parathyroid adenoma as per medical records     Hypertension  -Her blood pressure is acceptable in the office today  -currently remains on amlodipine, clonidine, doxazosin  Continue same     Low bicarb  -could be secondary to presume metabolic acidosis in the setting of CKD  current remains on sodium bicarbonate one tablet p  O  B i d  And continue same  Bicarb slightly improved at 22        Hyperkalemia  -patient claims to be more compliant with low-salt diet since last visit  Her last serum potassium 5 4   -will check serum aldosterone, PRA, continue low-potassium diet  -repeat BMP early next month       CKD BMD  -Her PTH is high along with intermittent high serum calcium likely suggestive of primary hyperparathyroidism versus lithium related  -Continue to monitor serum calcium phosphorus, PTH and serum magnesium level       Vitamin D insufficiency, remains on vitamin D 18269 units each month  Last vitamin-D level 35 in March 2018  Continue management as per PCP and endocrine  -will check repeat vitamin-D 25 hydroxy level in one month  Left renal mass  -being closely followed by Urology  Suspicion for malignancy    Options were discussed as per Urology not regarding surveillance versus surgery  Patient is still not decided if she wants to pursue with surgical option  She will make final decision by spring 2019   - renal scan in November 2018 showed symmetrical delayed and decreased perfusion to both kidneys  Split renal function around 50% in both kidneys (care everywhere)    Diagnoses and all orders for this visit:    Chronic kidney disease (CKD), stage III (moderate) ([facility] [address] )  -     Basic metabolic panel; Future  -     PTH, intact; Future  -     Phosphorus; Future  -     Protein / creatinine ratio, urine; Future  -     Protein electrophoresis, serum; Future  -     Protein electrophoresis, urine; Future  -     Calcium, ionized; Future  -     Basic metabolic panel; Future  -     Phosphorus; Future  -     PTH, intact; Future  -     CBC; Future  -     Protein / creatinine ratio, urine; Future    Benign hypertension with chronic kidney disease, stage III (HCC)  -     Aldosterone; Future  -     Renin Direct Assay; Future    Hypercalcemia  -     PTH, intact; Future  -     Phosphorus; Future  -     Protein electrophoresis, serum; Future  -     Protein electrophoresis, urine; Future  -     Calcium, ionized; Future  -     Calcium, ionized; Future  -     Phosphorus; Future  -     PTH, intact; Future  -     cinacalcet (SENSIPAR) 30 mg tablet; Take 1 tablet (30 mg total) by mouth daily after dinner    Primary hyperparathyroidism (HCC)  -     Calcium, ionized; Future  -     Calcium, ionized; Future  -     Phosphorus; Future  -     PTH, intact; Future  -     cinacalcet (SENSIPAR) 30 mg tablet; Take 1 tablet (30 mg total) by mouth daily after dinner    Vitamin D insufficiency  -     Vitamin D 25 hydroxy; Future  -     PTH, intact; Future    Hyperkalemia  -     Aldosterone; Future  -     Renin Direct Assay;  Future          SUBJECTIVE / HPI:  Patient is 59-year-old female with significant past medical history of hypertension for many years, hyperlipidemia, chronic kidney disease likely stage III, ? primary hyperparathyroidism and chronic lithium use but no history of diabetes or coronary artery disease comes for regular follow up for renal failure and hypercalcemia  She lives in the assisted living for last 5 years  She was last seen by endocrine in June 2018  She has been on lithium for many years and she was on a very high dose of lithium in the past 2400 mg as per patient but her dose has been reduced in last couple years to 300 mg daily and remains on stable dose  also has urinary incontinence and occasional urinary urgency  She has significant history of NSAIDs use for at least 4-5 years but she has not taken any NSAIDs lately  baseline serum creatinine is around 1 5 to 1 7  She has overall fluctuating serum creatinine  Last serum creatinine 1 7 overall stable at baseline  She claims to be more compliant with low-potassium diet since last visit  Last serum potassium 5 4 still slightly above goal     She was diagnosed initially with hypercalcemia in 2004 with normal vitamin D, high PTH level  She has been following up with endocrine as well and was thought to have likely primary hyperparathyroidism versus lithium related hypercalcemia  She cannot come off lithium as per psychiatry  denies having any history of kidney stones or fractures in the past  No any extra calcium supplements but she takes vitamin D 50,000 units once a month  She has family history positive for kidney issues  Her mother had a history of kidney stone, father history of kidney disease, sister has history of increase calcium level  She remains on Prolia by endocrine  As per my prior discussion with endocrine in September 2018, plan was to initiate Sensipar although she has not been started yet  REVIEW OF SYSTEMS:  More than 10 point review of systems were obtained and discussed in length with the patient   Complete review of systems were negative / unremarkable except mentioned above  PHYSICAL EXAM:  Vitals:    12/20/18 1029 12/20/18 1119   BP: 108/72 136/70   BP Location: Left arm    Patient Position: Sitting    Cuff Size: Standard    Pulse: 64    Resp: 16      There is no height or weight on file to calculate BMI  Physical Exam   Constitutional: She is oriented to person, place, and time  She appears well-developed and well-nourished  HENT:   Head: Normocephalic and atraumatic  Right Ear: External ear normal    Left Ear: External ear normal    Eyes: Pupils are equal, round, and reactive to light  Conjunctivae and EOM are normal    Neck: Neck supple  No JVD present  Cardiovascular: Normal rate and normal heart sounds  Pulmonary/Chest: Effort normal and breath sounds normal  She has no wheezes  She has no rales  Abdominal: Soft  Bowel sounds are normal  She exhibits no distension  There is no tenderness  Musculoskeletal: She exhibits no edema or tenderness  Neurological: She is alert and oriented to person, place, and time  Skin: Skin is warm and dry  No rash noted  Psychiatric: She has a normal mood and affect  Her behavior is normal    Vitals reviewed        PAST MEDICAL HISTORY:  Past Medical History:   Diagnosis Date    Allergic rhinitis     Anxiety     Back pain     Benign hypertension with chronic [facility] disease, stage III [facility])     Bipolar disorder ([facility])     Chronic low back pain     CKD (chronic kidney disease) stage 3, GFR 30-59 ml/min ([facility])     Depression     Hypercalcemia     Hyperlipidemia     Hypothyroidism     Muscle spasm     Peripheral neuropathy     Primary hyperparathyroidism ([address] )     Schizophrenia ([facility] [address] [facility]     Seborrhea     Urinary incontinence     Vitamin D insufficiency        PAST SURGICAL HISTORY:  Past Surgical History:   Procedure Laterality Date    TONSILLECTOMY AND ADENOIDECTOMY         SOCIAL HISTORY:  History   Alcohol Use No     History   Drug Use No     History   Smoking Status    Never Smoker   Smokeless Tobacco    Never Used       FAMILY HISTORY:  Family History   Problem Relation Age of Onset    Hypertension Mother     Hypertension Father        MEDICATIONS:    Current Outpatient Prescriptions:     Acetaminophen 325 MG CAPS, Take 650 mg by mouth 3 (three) times a day  , Disp: , Rfl:     amLODIPine (NORVASC) 10 mg tablet, Take 10 mg by mouth daily  , Disp: , Rfl:     budesonide (PULMICORT) 0 5 mg/2 mL nebulizer solution, Inhale 1 each 2 (two) times a day, Disp: , Rfl:     Cholecalciferol (VITAMIN D3) 57316 units CAPS, Take by mouth every 30 (thirty) days , Disp: , Rfl:     clonazePAM (KLONOPIN) 0 5 mg tablet, Take 1 tablet by mouth 2 (two) times a day, Disp: , Rfl:     cloNIDine (CATAPRES) 0 3 mg tablet, Take 1 tablet (0 3 mg total) by mouth 3 (three) times a day, Disp: 90 tablet, Rfl: 4    cyanocobalamin (VITAMIN B-12) 1,000 mcg tablet, Take by mouth daily  , Disp: , Rfl:     doxazosin (CARDURA) 2 mg tablet, Take 1 tablet by mouth daily, Disp: , Rfl:     lithium carbonate 300 mg capsule, Take 300 mg by mouth Daily  , Disp: , Rfl:     sertraline (ZOLOFT) 50 mg tablet, Take 50 mg by mouth daily  , Disp: , Rfl:     simvastatin (ZOCOR) 40 mg tablet, Take 40 mg by mouth daily at bedtime  , Disp: , Rfl:     sodium bicarbonate 650 mg tablet, Take 1 tablet (650 mg total) by mouth 2 (two) times a day, Disp: 60 tablet, Rfl: 4    cinacalcet (SENSIPAR) 30 mg tablet, Take 1 tablet (30 mg total) by mouth daily after dinner, Disp: 30 tablet, Rfl: 5    Lab Results:   Results for orders placed or performed in visit on 09/13/18   PTH, intact   Result Value Ref Range      2     Last serum creatinine 1 7, potassium 5 4

## 2018-12-20 NOTE — LETTER
December 20, 2018     Mary Ellen Duncan MD  [address] [facility] [address]    Patient: Dylan Alonso   YOB: 1945   Date of Visit: 12/20/2018       Dear Dr Jesica Villanueva: Thank you for referring Mariah Jason to me for evaluation  Below are my notes for this consultation  If you have questions, please do not hesitate to call me  I look forward to following your patient along with you  Sincerely,        Gera Braxton MD        CC: No Recipients  Gera Braxton MD  12/20/2018 12:25 PM  Sign at close encounter  [address] 74 y o  female MRN: [id number]  DATE: 12/20/2018  Reason for visit:   Chief Complaint   Patient presents with    Follow-up    Chronic Kidney Disease     ASSESSMENT and PLAN:  Chronic kidney disease stage III B  with her baseline serum creatinine around 1 5-1 7 (previous baseline 1 3 to 1 4 in 2014)  -last creatinine 1 7 in October 2018 overall stable at baseline    -Repeat, CBC, BMP, phosphorus, PTH, UPC ratio before next visit   -Her chronic kidney disease can be multifactorial given her long-term history of hypertension causing likely hypertensive nephrosclerosis, chronic lithium use can cause chronic interstitial nephritis, and significant use of NSAIDs in the past   -Patient not able to come off lithium as she has been doing very well on tapering dose of lithium   -Continue to monitor serum creatinine closely    -No NSAIDs or nephrotoxins; No Fleet enema  -renal ultrasound in November 2018 showed right kidney 9 9 cm, left kidney 12 2 cm, no hydronephrosis although exophytic solid mass in the lower pole of left kidney    Prior renal ultrasound in 2016 showed diffuse increased echogenicity in both kidneys which was not reported on most recent ultrasound      likely primary hyperparathyroidism (given the high PTH, high calcium, low normal vitamin D level, normal phosphorus level) versus chronic lithium use which can also cause hypercalcemia by increasing PTH  -patient is unable to come off lithium as per patient discussion with Psychiatry   -vitamin D 25 hydroxy level 35 in March 2018,  in September 2018, last serum calcium 10 9 in October 2018    -Her previous 24-hour urine calcium level was low 36 in February 2014 which can be secondary to renal failure and chronic lithium use  -as per my prior discussion with patient's endocrinologist Dr Cari Tran in September 2018, plan is to start Sensipar as patient does not want to proceed with surgery  Patient has not been started on Sensipar since then  Will start Sensipar 30 mg p o  Daily   -remains on Prolia injections Q six monthly  Next scheduled in January 2019 as per patient   -Continue followup with endocrinology  -Her parathyroid scan showed an parathyroid adenoma as per medical records     Hypertension  -Her blood pressure is acceptable in the office today  -currently remains on amlodipine, clonidine, doxazosin  Continue same     Low bicarb  -could be secondary to presume metabolic acidosis in the setting of CKD  current remains on sodium bicarbonate one tablet p  O  B i d  And continue same  Bicarb slightly improved at 22        Hyperkalemia  -patient claims to be more compliant with low-salt diet since last visit  Her last serum potassium 5 4   -will check serum aldosterone, PRA, continue low-potassium diet  -repeat BMP early next month       CKD BMD  -Her PTH is high along with intermittent high serum calcium likely suggestive of primary hyperparathyroidism versus lithium related  -Continue to monitor serum calcium phosphorus, PTH and serum magnesium level       Vitamin D insufficiency, remains on vitamin D 80956 units each month  Last vitamin-D level 35 in March 2018  Continue management as per PCP and endocrine  -will check repeat vitamin-D 25 hydroxy level in one month  Left renal mass  -being closely followed by Urology    Suspicion for malignancy  Options were discussed as per Urology not regarding surveillance versus surgery  Patient is still not decided if she wants to pursue with surgical option  She will make final decision by spring 2019   - renal scan in November 2018 showed symmetrical delayed and decreased perfusion to both kidneys  Split renal function around 50% in both kidneys (care everywhere)    Diagnoses and all orders for this visit:    Chronic kidney disease (CKD), stage III (moderate) ([facility] [address] )  -     Basic metabolic panel; Future  -     PTH, intact; Future  -     Phosphorus; Future  -     Protein / creatinine ratio, urine; Future  -     Protein electrophoresis, serum; Future  -     Protein electrophoresis, urine; Future  -     Calcium, ionized; Future  -     Basic metabolic panel; Future  -     Phosphorus; Future  -     PTH, intact; Future  -     CBC; Future  -     Protein / creatinine ratio, urine; Future    Benign hypertension with chronic kidney disease, stage III (HCC)  -     Aldosterone; Future  -     Renin Direct Assay; Future    Hypercalcemia  -     PTH, intact; Future  -     Phosphorus; Future  -     Protein electrophoresis, serum; Future  -     Protein electrophoresis, urine; Future  -     Calcium, ionized; Future  -     Calcium, ionized; Future  -     Phosphorus; Future  -     PTH, intact; Future  -     cinacalcet (SENSIPAR) 30 mg tablet; Take 1 tablet (30 mg total) by mouth daily after dinner    Primary hyperparathyroidism (HCC)  -     Calcium, ionized; Future  -     Calcium, ionized; Future  -     Phosphorus; Future  -     PTH, intact; Future  -     cinacalcet (SENSIPAR) 30 mg tablet; Take 1 tablet (30 mg total) by mouth daily after dinner    Vitamin D insufficiency  -     Vitamin D 25 hydroxy; Future  -     PTH, intact; Future    Hyperkalemia  -     Aldosterone; Future  -     Renin Direct Assay;  Future          SUBJECTIVE / HPI:  Patient is 75-year-old female with significant past medical history of hypertension for many years, hyperlipidemia, chronic kidney disease likely stage III, ? primary hyperparathyroidism and chronic lithium use but no history of diabetes or coronary artery disease comes for regular follow up for renal failure and hypercalcemia  She lives in the assisted living for last 5 years  She was last seen by endocrine in June 2018  She has been on lithium for many years and she was on a very high dose of lithium in the past 2400 mg as per patient but her dose has been reduced in last couple years to 300 mg daily and remains on stable dose  also has urinary incontinence and occasional urinary urgency  She has significant history of NSAIDs use for at least 4-5 years but she has not taken any NSAIDs lately  baseline serum creatinine is around 1 5 to 1 7  She has overall fluctuating serum creatinine  Last serum creatinine 1 7 overall stable at baseline  She claims to be more compliant with low-potassium diet since last visit  Last serum potassium 5 4 still slightly above goal     She was diagnosed initially with hypercalcemia in 2004 with normal vitamin D, high PTH level  She has been following up with endocrine as well and was thought to have likely primary hyperparathyroidism versus lithium related hypercalcemia  She cannot come off lithium as per psychiatry  denies having any history of kidney stones or fractures in the past  No any extra calcium supplements but she takes vitamin D 50,000 units once a month  She has family history positive for kidney issues  Her mother had a history of kidney stone, father history of kidney disease, sister has history of increase calcium level  She remains on Prolia by endocrine  As per my prior discussion with endocrine in September 2018, plan was to initiate Sensipar although she has not been started yet  REVIEW OF SYSTEMS:  More than 10 point review of systems were obtained and discussed in length with the patient   Complete review of systems were negative / unremarkable except mentioned above  PHYSICAL EXAM:  Vitals:    12/20/18 1029 12/20/18 1119   BP: 108/72 136/70   BP Location: Left arm    Patient Position: Sitting    Cuff Size: Standard    Pulse: 64    Resp: 16      There is no height or weight on file to calculate BMI  Physical Exam   Constitutional: She is oriented to person, place, and time  She appears well-developed and well-nourished  HENT:   Head: Normocephalic and atraumatic  Right Ear: External ear normal    Left Ear: External ear normal    Eyes: Pupils are equal, round, and reactive to light  Conjunctivae and EOM are normal    Neck: Neck supple  No JVD present  Cardiovascular: Normal rate and normal heart sounds  Pulmonary/Chest: Effort normal and breath sounds normal  She has no wheezes  She has no rales  Abdominal: Soft  Bowel sounds are normal  She exhibits no distension  There is no tenderness  Musculoskeletal: She exhibits no edema or tenderness  Neurological: She is alert and oriented to person, place, and time  Skin: Skin is warm and dry  No rash noted  Psychiatric: She has a normal mood and affect  Her behavior is normal    Vitals reviewed        PAST MEDICAL HISTORY:  Past Medical History:   Diagnosis Date    Allergic rhinitis     Anxiety     Back pain     Benign hypertension with chronic kidney disease, stage III ([facility])     Bipolar disorder ([facility])     Chronic low back pain     CKD (chronic kidney disease) stage 3, GFR 30-59 ml/min ([facility])     Depression     Hypercalcemia     Hyperlipidemia     Hypothyroidism     Muscle spasm     Peripheral neuropathy     Primary hyperparathyroidism ([address] )     Schizophrenia ([address] )     Seborrhea     Urinary incontinence     Vitamin D insufficiency        PAST SURGICAL HISTORY:  Past Surgical History:   Procedure Laterality Date    TONSILLECTOMY AND ADENOIDECTOMY         SOCIAL HISTORY:  History   Alcohol Use No     History   Drug Use No     History   Smoking Status    Never Smoker Smokeless Tobacco    Never Used       FAMILY HISTORY:  Family History   Problem Relation Age of Onset    Hypertension Mother     Hypertension Father        MEDICATIONS:    Current Outpatient Prescriptions:     Acetaminophen 325 MG CAPS, Take 650 mg by mouth 3 (three) times a day  , Disp: , Rfl:     amLODIPine (NORVASC) 10 mg tablet, Take 10 mg by mouth daily  , Disp: , Rfl:     budesonide (PULMICORT) 0 5 mg/2 mL nebulizer solution, Inhale 1 each 2 (two) times a day, Disp: , Rfl:     Cholecalciferol (VITAMIN D3) 89697 units CAPS, Take by mouth every 30 (thirty) days , Disp: , Rfl:     clonazePAM (KLONOPIN) 0 5 mg tablet, Take 1 tablet by mouth 2 (two) times a day, Disp: , Rfl:     cloNIDine (CATAPRES) 0 3 mg tablet, Take 1 tablet (0 3 mg total) by mouth 3 (three) times a day, Disp: 90 tablet, Rfl: 4    cyanocobalamin (VITAMIN B-12) 1,000 mcg tablet, Take by mouth daily  , Disp: , Rfl:     doxazosin (CARDURA) 2 mg tablet, Take 1 tablet by mouth daily, Disp: , Rfl:     lithium carbonate 300 mg capsule, Take 300 mg by mouth Daily  , Disp: , Rfl:     sertraline (ZOLOFT) 50 mg tablet, Take 50 mg by mouth daily  , Disp: , Rfl:     simvastatin (ZOCOR) 40 mg tablet, Take 40 mg by mouth daily at bedtime  , Disp: , Rfl:     sodium bicarbonate 650 mg tablet, Take 1 tablet (650 mg total) by mouth 2 (two) times a day, Disp: 60 tablet, Rfl: 4    cinacalcet (SENSIPAR) 30 mg tablet, Take 1 tablet (30 mg total) by mouth daily after dinner, Disp: 30 tablet, Rfl: 5    Lab Results:   Results for orders placed or performed in visit on 09/13/18   PTH, intact   Result Value Ref Range      2     Last serum creatinine 1 7, potassium 5 4

## 2018-12-20 NOTE — PROGRESS NOTES
NEPHROLOGY OUTPATIENT PROGRESS NOTE   Lamonte Duval 74 y o  female MRN: [id number]  DATE: 12/20/2018  Reason for visit:   Chief Complaint   Patient presents with    Follow-up    Chronic Kidney Disease     ASSESSMENT and PLAN:  Chronic kidney disease stage III B  with her baseline serum creatinine around 1 5-1 7 (previous baseline 1 3 to 1 4 in 2014)  -last creatinine 1 7 in October 2018 overall stable at baseline    -Repeat, CBC, BMP, phosphorus, PTH, UPC ratio before next visit   -Her chronic kidney disease can be multifactorial given her long-term history of hypertension causing likely hypertensive nephrosclerosis, chronic lithium use can cause chronic interstitial nephritis, and significant use of NSAIDs in the past   -Patient not able to come off lithium as she has been doing very well on tapering dose of lithium   -Continue to monitor serum creatinine closely    -No NSAIDs or nephrotoxins; No Fleet enema  -renal ultrasound in November 2018 showed right kidney 9 9 cm, left kidney 12 2 cm, no hydronephrosis although exophytic solid mass in the lower pole of left kidney  Prior renal ultrasound in 2016 showed diffuse increased echogenicity in both kidneys which was not reported on most recent ultrasound      likely primary hyperparathyroidism (given the high PTH, high calcium, low normal vitamin D level, normal phosphorus level) versus chronic lithium use which can also cause hypercalcemia by increasing PTH  -patient is unable to come off lithium as per patient discussion with Psychiatry   -vitamin D 25 hydroxy level 35 in March 2018,  in September 2018, last serum calcium 10 9 in October 2018    -Her previous 24-hour urine calcium level was low 36 in February 2014 which can be secondary to renal failure and chronic lithium use  -as per my prior discussion with patient's endocrinologist Dr Gallito Baker in September 2018, plan is to start Sensipar as patient does not want to proceed with surgery    Patient has not been started on Sensipar since then  Will start Sensipar 30 mg p o  Daily   -remains on Prolia injections Q six monthly  Next scheduled in January 2019 as per patient   -Continue followup with endocrinology  -Her parathyroid scan showed an parathyroid adenoma as per medical records     Hypertension  -Her blood pressure is acceptable in the office today  -currently remains on amlodipine, clonidine, doxazosin  Continue same     Low bicarb  -could be secondary to presume metabolic acidosis in the setting of CKD  current remains on sodium bicarbonate one tablet p  O  B i d  And continue same  Bicarb slightly improved at 22        Hyperkalemia  -patient claims to be more compliant with low-salt diet since last visit  Her last serum potassium 5 4   -will check serum aldosterone, PRA, continue low-potassium diet  -repeat BMP early next month       CKD BMD  -Her PTH is high along with intermittent high serum calcium likely suggestive of primary hyperparathyroidism versus lithium related  -Continue to monitor serum calcium phosphorus, PTH and serum magnesium level       Vitamin D insufficiency, remains on vitamin D 51520 units each month  Last vitamin-D level 35 in March 2018  Continue management as per PCP and endocrine  -will check repeat vitamin-D 25 hydroxy level in one month  Left renal mass  -being closely followed by Urology  Suspicion for malignancy  Options were discussed as per Urology not regarding surveillance versus surgery  Patient is still not decided if she wants to pursue with surgical option  She will make final decision by spring 2019   - renal scan in November 2018 showed symmetrical delayed and decreased perfusion to both kidneys  Split renal function around 50% in both kidneys (care everywhere)    Diagnoses and all orders for this visit:    Chronic kidney disease (CKD), stage III (moderate) ([facility] [address] )  -     Basic metabolic panel; Future  -     PTH, intact;  Future  - Phosphorus; Future  -     Protein / creatinine ratio, urine; Future  -     Protein electrophoresis, serum; Future  -     Protein electrophoresis, urine; Future  -     Calcium, ionized; Future  -     Basic metabolic panel; Future  -     Phosphorus; Future  -     PTH, intact; Future  -     CBC; Future  -     Protein / creatinine ratio, urine; Future    Benign hypertension with chronic kidney disease, stage III (HCC)  -     Aldosterone; Future  -     Renin Direct Assay; Future    Hypercalcemia  -     PTH, intact; Future  -     Phosphorus; Future  -     Protein electrophoresis, serum; Future  -     Protein electrophoresis, urine; Future  -     Calcium, ionized; Future  -     Calcium, ionized; Future  -     Phosphorus; Future  -     PTH, intact; Future  -     cinacalcet (SENSIPAR) 30 mg tablet; Take 1 tablet (30 mg total) by mouth daily after dinner    Primary hyperparathyroidism (HCC)  -     Calcium, ionized; Future  -     Calcium, ionized; Future  -     Phosphorus; Future  -     PTH, intact; Future  -     cinacalcet (SENSIPAR) 30 mg tablet; Take 1 tablet (30 mg total) by mouth daily after dinner    Vitamin D insufficiency  -     Vitamin D 25 hydroxy; Future  -     PTH, intact; Future    Hyperkalemia  -     Aldosterone; Future  -     Renin Direct Assay; Future          SUBJECTIVE / HPI:  Patient is 77-year-old female with significant past medical history of hypertension for many years, hyperlipidemia, chronic kidney disease likely stage III, ? primary hyperparathyroidism and chronic lithium use but no history of diabetes or coronary artery disease comes for regular follow up for renal failure and hypercalcemia  She lives in the assisted living for last 5 years  She was last seen by endocrine in June 2018  She has been on lithium for many years and she was on a very high dose of lithium in the past 2400 mg as per patient but her dose has been reduced in last couple years to 300 mg daily and remains on stable dose   also has urinary incontinence and occasional urinary urgency  She has significant history of NSAIDs use for at least 4-5 years but she has not taken any NSAIDs lately  baseline serum creatinine is around 1 5 to 1 7  She has overall fluctuating serum creatinine  Last serum creatinine 1 7 overall stable at baseline  She claims to be more compliant with low-potassium diet since last visit  Last serum potassium 5 4 still slightly above goal     She was diagnosed initially with hypercalcemia in 2004 with normal vitamin D, high PTH level  She has been following up with endocrine as well and was thought to have likely primary hyperparathyroidism versus lithium related hypercalcemia  She cannot come off lithium as per psychiatry  denies having any history of kidney stones or fractures in the past  No any extra calcium supplements but she takes vitamin D 50,000 units once a month  She has family history positive for kidney issues  Her mother had a history of kidney stone, father history of kidney disease, sister has history of increase calcium level  She remains on Prolia by endocrine  As per my prior discussion with endocrine in September 2018, plan was to initiate Sensipar although she has not been started yet  REVIEW OF SYSTEMS:  More than 10 point review of systems were obtained and discussed in length with the patient  Complete review of systems were negative / unremarkable except mentioned above  PHYSICAL EXAM:  Vitals:    12/20/18 1029 12/20/18 1119   BP: 108/72 136/70   BP Location: Left arm    Patient Position: Sitting    Cuff Size: Standard    Pulse: 64    Resp: 16      There is no height or weight on file to calculate BMI  Physical Exam   Constitutional: She is oriented to person, place, and time  She appears well-developed and well-nourished  HENT:   Head: Normocephalic and atraumatic     Right Ear: External ear normal    Left Ear: External ear normal    Eyes: Pupils are equal, round, and reactive to light  Conjunctivae and EOM are normal    Neck: Neck supple  No JVD present  Cardiovascular: Normal rate and normal heart sounds  Pulmonary/Chest: Effort normal and breath sounds normal  She has no wheezes  She has no rales  Abdominal: Soft  Bowel sounds are normal  She exhibits no distension  There is no tenderness  Musculoskeletal: She exhibits no edema or tenderness  Neurological: She is alert and oriented to person, place, and time  Skin: Skin is warm and dry  No rash noted  Psychiatric: She has a normal mood and affect  Her behavior is normal    Vitals reviewed        PAST MEDICAL HISTORY:  Past Medical History:   Diagnosis Date    Allergic rhinitis     Anxiety     Back pain     Benign hypertension with chronic kidney disease, stage III ([facility])     Bipolar disorder ([facility])     Chronic low back pain     CKD (chronic kidney disease) stage 3, GFR 30-59 ml/min ([facility])     Depression     Hypercalcemia     Hyperlipidemia     Hypothyroidism     Muscle spasm     Peripheral neuropathy     [facility] hyperpar[facility] ([facility] [address] )     Schizophrenia ([facility] [address] )     Seborrhea     Urinary incontinence     Vitamin D insufficiency        PAST SURGICAL HISTORY:  Past Surgical History:   Procedure Laterality Date    TONSILLECTOMY AND ADENOIDECTOMY         SOCIAL HISTORY:  History   Alcohol Use No     History   Drug Use No     History   Smoking Status    Never Smoker   Smokeless Tobacco    Never Used       FAMILY HISTORY:  Family History   Problem Relation Age of Onset    Hypertension Mother     Hypertension Father        MEDICATIONS:    Current Outpatient Prescriptions:     Acetaminophen 325 MG CAPS, Take 650 mg by mouth 3 (three) times a day  , Disp: , Rfl:     amLODIPine (NORVASC) 10 mg tablet, Take 10 mg by mouth daily  , Disp: , Rfl:     budesonide (PULMICORT) 0 5 mg/2 mL nebulizer solution, Inhale 1 each 2 (two) times a day, Disp: , Rfl:     Cholecalciferol (VITAMIN D3) 53410 units CAPS, Take by mouth every 30 (thirty) days , Disp: , Rfl:     clonazePAM (KLONOPIN) 0 5 mg tablet, Take 1 tablet by mouth 2 (two) times a day, Disp: , Rfl:     cloNIDine (CATAPRES) 0 3 mg tablet, Take 1 tablet (0 3 mg total) by mouth 3 (three) times a day, Disp: 90 tablet, Rfl: 4    cyanocobalamin (VITAMIN B-12) 1,000 mcg tablet, Take by mouth daily  , Disp: , Rfl:     doxazosin (CARDURA) 2 mg tablet, Take 1 tablet by mouth daily, Disp: , Rfl:     lithium carbonate 300 mg capsule, Take 300 mg by mouth Daily  , Disp: , Rfl:     sertraline (ZOLOFT) 50 mg tablet, Take 50 mg by mouth daily  , Disp: , Rfl:     simvastatin (ZOCOR) 40 mg tablet, Take 40 mg by mouth daily at bedtime  , Disp: , Rfl:     sodium bicarbonate 650 mg tablet, Take 1 tablet (650 mg total) by mouth 2 (two) times a day, Disp: 60 tablet, Rfl: 4    cinacalcet (SENSIPAR) 30 mg tablet, Take 1 tablet (30 mg total) by mouth daily after dinner, Disp: 30 tablet, Rfl: 5    Lab Results:   Results for orders placed or performed in visit on 09/13/18   PTH, intact   Result Value Ref Range      2     Last serum creatinine 1 7, potassium 5 4

## 2019-01-15 ENCOUNTER — TELEPHONE (OUTPATIENT)
Dept: NEPHROLOGY | Facility: CLINIC | Age: 74
End: 2019-01-15

## 2019-01-15 DIAGNOSIS — E83.52 HYPERCALCEMIA: Primary | ICD-10-CM

## 2019-01-15 DIAGNOSIS — N18.30 CHRONIC KIDNEY DISEASE (CKD), STAGE III (MODERATE) (HCC): ICD-10-CM

## 2019-01-15 DIAGNOSIS — E21.0 PRIMARY HYPERPARATHYROIDISM (HCC): ICD-10-CM

## 2019-01-15 RX ORDER — CINACALCET 30 MG/1
60 TABLET, FILM COATED ORAL
Qty: 60 TABLET | Refills: 5 | Status: SHIPPED | OUTPATIENT
Start: 2019-01-15

## 2019-01-15 NOTE — TELEPHONE ENCOUNTER
Can you please let patient know that creatinine has significantly worsened at 2 1 and also calcium remains elevated  I have started her on Sensipar 30 mg daily during last office visit, can you please confirm patient is taking that? If patient is taking Sensipar regularly, would like to increase Sensipar to 60 mg p o  Daily  Also vitamin-D level remains low at 17  I believe she is getting vitamin-D supplement 63619 units monthly from Endocrine  She should contact endocrine if vitamin-D supplement dose needs to be adjusted  She should get repeat CMP, ionized calcium in 7 to 10 days  She should drink plenty of free water  Avoid any other calcium supplements

## 2019-01-17 ENCOUNTER — TELEPHONE (OUTPATIENT)
Dept: NEPHROLOGY | Facility: CLINIC | Age: 74
End: 2019-01-17

## 2019-01-17 NOTE — TELEPHONE ENCOUNTER
Patient's brother called back from no return call  I read him word for word doctor daryl's notes from the results of pt blood work  Giselle Toledo ( pt brother ) stated that yes she is taking 30 mg  Sensipar   Patient call back number is [phone]

## 2019-01-17 NOTE — TELEPHONE ENCOUNTER
I have left voicemail at 4:25 p m  On 1/17/19 for Vanesa Ramos who initially called back to the office  Advised him to call back

## 2019-01-18 NOTE — TELEPHONE ENCOUNTER
I spoke to the patient's brother, he said he just learned from the pharmacy that the patient isn't on Sensipar right now because the insurance is not paying for it  He spoke to the nursing home and they apparently didn't realize this  So, the pharmacist said he would look into it and get back to Augusto Mariano  We are awaiting his call

## 2019-01-21 ENCOUNTER — TRANSCRIBE ORDERS (OUTPATIENT)
Dept: ADMINISTRATIVE | Facility: HOSPITAL | Age: 74
End: 2019-01-21

## 2019-01-21 DIAGNOSIS — E04.2 MULTINODULAR THYROID: Primary | ICD-10-CM

## 2019-01-24 ENCOUNTER — TELEPHONE (OUTPATIENT)
Dept: NEPHROLOGY | Facility: CLINIC | Age: 74
End: 2019-01-24

## 2019-01-24 NOTE — TELEPHONE ENCOUNTER
Kike Early from patients assisted living facility called and reported a prior authorization is needed for patients [employer] is patients insurance for medication coverage per Kike Early  I have started the PA- as the PA in the system that shows denied does not have the proper medication dosage  ________________________________________________  1 24 19 at 2563  According to Leigh Lorenzana at [facility] patient does not carry a Part D and this medication will not be covered due to this  I have reached back out to the pharmacy to let them know and Kezia Torres at [facility] is going to let the POA of the patient know about the insurance coverage  Dr Douglas Sánchez wants to know if there is an alternative?     Thank you

## 2019-01-29 NOTE — TELEPHONE ENCOUNTER
Did they explain why sensipar 30 mg daily is not proper dose? Anyhow, if it is not covered, please let Dr Giovanny White office know as he was originally going to start this medication and I ended up seeing this patient early and started this  He may be able to appeal if that is the case as it is mainly for endocrine disorder primary hyperparathyroidism

## 2019-01-30 NOTE — TELEPHONE ENCOUNTER
Called prior [facility] [[email or web address] department and the appeal has been denied  Please advise if you would like to switch the medication

## 2019-01-30 NOTE — TELEPHONE ENCOUNTER
Can you please let Dr Kalani Vigil office know to pass this message to him as urgent task? Its mainly for endocrine issue  He can decide as what is next step if this medication can not be given  Thanks

## 2019-01-30 NOTE — TELEPHONE ENCOUNTER
The denial letter states they wanted the patient to have a parathyroidectomy prior to medication start however when I spoke to [facility] the denial is mainly due to the patient not carrying Part D according to her insurance  Juanita Terry as taken this project over so she would know more from here      Thank you,  [facility]

## 2019-02-01 ENCOUNTER — TELEPHONE (OUTPATIENT)
Dept: NEPHROLOGY | Facility: CLINIC | Age: 74
End: 2019-02-01

## 2019-02-01 NOTE — TELEPHONE ENCOUNTER
Patients brother came into the office today he would like you to please give him a call, he has some concerns about a medication she is currently taking because her insurance is not covering it

## 2019-02-04 NOTE — TELEPHONE ENCOUNTER
Left voicemail for patient's brother to notify him about issues with Sensipar and not being approved from insurance  Also recommended him to contact Dr Leal Cool office to determine what would be the next step

## 2019-02-07 ENCOUNTER — TELEPHONE (OUTPATIENT)
Dept: NEPHROLOGY | Facility: CLINIC | Age: 74
End: 2019-02-07

## 2019-02-07 NOTE — TELEPHONE ENCOUNTER
Called and left a voicemail in regards to scheduling patient for her April follow up appointment  Left our call back number

## 2019-03-05 ENCOUNTER — TELEPHONE (OUTPATIENT)
Dept: OTHER | Facility: HOSPITAL | Age: 74
End: 2019-03-05

## 2019-03-05 NOTE — TELEPHONE ENCOUNTER
Spoke to patient's son Kathryn Kelsea over the phone regarding Sensipar issue  As per son, Dr Neo Rider got it approved from insurance company and patient is back on sensipar currently  Patient is scheduled for office follow-up in April 2019 and he knows to get lab work done prior to office visit

## 2019-04-03 ENCOUNTER — TELEPHONE (OUTPATIENT)
Dept: NEPHROLOGY | Facility: CLINIC | Age: 74
End: 2019-04-03

## 2019-04-09 LAB
EXT GLUCOSE BLD: 84
EXTERNAL ANION GAP: 12
EXTERNAL BUN: 24
EXTERNAL CALCIUM: 9.2
EXTERNAL CHLORIDE: 115
EXTERNAL CO2: 22
EXTERNAL CREATININE: 1.46
EXTERNAL EGFR: 35
EXTERNAL POTASSIUM: 5
EXTERNAL SODIUM: 144

## 2019-04-10 ENCOUNTER — OFFICE VISIT (OUTPATIENT)
Dept: NEPHROLOGY | Facility: CLINIC | Age: 74
End: 2019-04-10
Payer: COMMERCIAL

## 2019-04-10 VITALS
WEIGHT: 139.2 LBS | DIASTOLIC BLOOD PRESSURE: 90 MMHG | HEART RATE: 80 BPM | SYSTOLIC BLOOD PRESSURE: 142 MMHG | HEIGHT: 65 IN | BODY MASS INDEX: 23.19 KG/M2

## 2019-04-10 DIAGNOSIS — N18.30 CHRONIC KIDNEY DISEASE (CKD), STAGE III (MODERATE) (HCC): Primary | ICD-10-CM

## 2019-04-10 DIAGNOSIS — N18.30 BENIGN HYPERTENSION WITH CHRONIC KIDNEY DISEASE, STAGE III (HCC): ICD-10-CM

## 2019-04-10 DIAGNOSIS — E21.0 PRIMARY HYPERPARATHYROIDISM (HCC): ICD-10-CM

## 2019-04-10 DIAGNOSIS — I12.9 BENIGN HYPERTENSION WITH CHRONIC KIDNEY DISEASE, STAGE III (HCC): ICD-10-CM

## 2019-04-10 PROCEDURE — 99213 OFFICE O/P EST LOW 20 MIN: CPT | Performed by: PHYSICIAN ASSISTANT

## 2019-04-10 RX ORDER — DIVALPROEX SODIUM 125 MG/1
250 TABLET, DELAYED RELEASE ORAL EVERY 12 HOURS SCHEDULED
COMMUNITY

## 2019-04-10 RX ORDER — SENNA PLUS 8.6 MG/1
1 TABLET ORAL 2 TIMES DAILY
COMMUNITY

## 2019-04-10 RX ORDER — FERROUS SULFATE 325(65) MG
325 TABLET ORAL 2 TIMES DAILY WITH MEALS
COMMUNITY

## 2019-04-10 RX ORDER — POLYETHYLENE GLYCOL 3350 17 G/17G
17 POWDER, FOR SOLUTION ORAL DAILY
COMMUNITY

## 2019-04-10 RX ORDER — OLANZAPINE 7.5 MG/1
7.5 TABLET ORAL
COMMUNITY

## 2019-06-17 ENCOUNTER — HOSPITAL ENCOUNTER (OUTPATIENT)
Dept: RADIOLOGY | Age: 74
Discharge: HOME | End: 2019-06-17
Payer: COMMERCIAL

## 2019-06-17 DIAGNOSIS — E04.2 MULTINODULAR THYROID: ICD-10-CM

## 2019-06-17 PROCEDURE — 76536 US EXAM OF HEAD AND NECK: CPT
